# Patient Record
Sex: MALE | Race: WHITE | NOT HISPANIC OR LATINO | Employment: FULL TIME | URBAN - METROPOLITAN AREA
[De-identification: names, ages, dates, MRNs, and addresses within clinical notes are randomized per-mention and may not be internally consistent; named-entity substitution may affect disease eponyms.]

---

## 2023-01-30 PROBLEM — E55.9 VITAMIN D DEFICIENCY: Status: ACTIVE | Noted: 2023-01-30

## 2023-01-30 PROBLEM — M81.8 OTHER OSTEOPOROSIS WITHOUT CURRENT PATHOLOGICAL FRACTURE: Status: ACTIVE | Noted: 2023-01-30

## 2023-01-30 PROBLEM — M72.2 PLANTAR FASCIITIS: Status: ACTIVE | Noted: 2023-01-30

## 2023-01-30 PROBLEM — G89.29 CHRONIC MUSCULOSKELETAL PAIN: Status: ACTIVE | Noted: 2023-01-30

## 2023-01-30 PROBLEM — G89.29 CHRONIC LOW BACK PAIN: Status: ACTIVE | Noted: 2023-01-30

## 2023-01-30 PROBLEM — M25.552 BILATERAL HIP PAIN: Status: ACTIVE | Noted: 2023-01-30

## 2023-01-30 PROBLEM — R09.82 POSTNASAL DRIP: Status: ACTIVE | Noted: 2023-01-30

## 2023-01-30 PROBLEM — I10 HYPERTENSION: Status: ACTIVE | Noted: 2023-01-30

## 2023-01-30 PROBLEM — E79.0 HYPERURICEMIA: Status: ACTIVE | Noted: 2023-01-30

## 2023-01-30 PROBLEM — M79.18 CHRONIC MUSCULOSKELETAL PAIN: Status: ACTIVE | Noted: 2023-01-30

## 2023-01-30 PROBLEM — L98.9 SKIN LESION: Status: ACTIVE | Noted: 2023-01-30

## 2023-01-30 PROBLEM — R20.2 PARESTHESIA: Status: ACTIVE | Noted: 2023-01-30

## 2023-01-30 PROBLEM — R13.10 DYSPHAGIA, UNSPECIFIED: Status: ACTIVE | Noted: 2023-01-30

## 2023-01-30 PROBLEM — J30.9 ALLERGIC RHINITIS: Status: ACTIVE | Noted: 2023-01-30

## 2023-01-30 PROBLEM — G89.29 CHRONIC BACK PAIN: Status: ACTIVE | Noted: 2023-01-30

## 2023-01-30 PROBLEM — N40.1 BENIGN PROSTATIC HYPERPLASIA WITH LOWER URINARY TRACT SYMPTOMS: Status: ACTIVE | Noted: 2023-01-30

## 2023-01-30 PROBLEM — R10.2 PELVIC PAIN IN MALE: Status: ACTIVE | Noted: 2023-01-30

## 2023-01-30 PROBLEM — G89.4 CHRONIC PAIN SYNDROME: Status: ACTIVE | Noted: 2023-01-30

## 2023-01-30 PROBLEM — M25.551 BILATERAL HIP PAIN: Status: ACTIVE | Noted: 2023-01-30

## 2023-01-30 PROBLEM — M79.7 FIBROMYALGIA: Status: ACTIVE | Noted: 2023-01-30

## 2023-01-30 PROBLEM — R06.02 SOB (SHORTNESS OF BREATH) ON EXERTION: Status: ACTIVE | Noted: 2023-01-30

## 2023-01-30 PROBLEM — M25.50 ARTHRALGIA OF MULTIPLE SITES, BILATERAL: Status: ACTIVE | Noted: 2023-01-30

## 2023-01-30 PROBLEM — M79.669 PAIN, LOWER LEG: Status: ACTIVE | Noted: 2023-01-30

## 2023-01-30 PROBLEM — R10.30 GROIN PAIN: Status: ACTIVE | Noted: 2023-01-30

## 2023-01-30 PROBLEM — R10.9 RIGHT FLANK PAIN: Status: ACTIVE | Noted: 2023-01-30

## 2023-01-30 PROBLEM — R73.9 HYPERGLYCEMIA: Status: ACTIVE | Noted: 2023-01-30

## 2023-01-30 PROBLEM — N64.4 NIPPLE PAIN: Status: ACTIVE | Noted: 2023-01-30

## 2023-01-30 PROBLEM — N52.9 ERECTILE DYSFUNCTION: Status: ACTIVE | Noted: 2023-01-30

## 2023-01-30 PROBLEM — K30 INDIGESTION: Status: ACTIVE | Noted: 2023-01-30

## 2023-01-30 PROBLEM — N18.30 CKD (CHRONIC KIDNEY DISEASE), STAGE III (MULTI): Status: ACTIVE | Noted: 2023-01-30

## 2023-01-30 PROBLEM — R63.5 UNINTENDED WEIGHT GAIN: Status: ACTIVE | Noted: 2023-01-30

## 2023-01-30 PROBLEM — M54.50 CHRONIC LOW BACK PAIN: Status: ACTIVE | Noted: 2023-01-30

## 2023-01-30 PROBLEM — R10.31 RIGHT LOWER QUADRANT ABDOMINAL PAIN OF UNKNOWN ETIOLOGY: Status: ACTIVE | Noted: 2023-01-30

## 2023-01-30 PROBLEM — R51.9 HEADACHE: Status: ACTIVE | Noted: 2023-01-30

## 2023-01-30 PROBLEM — N50.82 PAIN IN SCROTUM WITHOUT TRAUMA OF SCROTUM: Status: ACTIVE | Noted: 2023-01-30

## 2023-01-30 PROBLEM — E66.3 OVERWEIGHT (BMI 25.0-29.9): Status: ACTIVE | Noted: 2023-01-30

## 2023-01-30 PROBLEM — K58.9 IRRITABLE BOWEL SYNDROME: Status: ACTIVE | Noted: 2023-01-30

## 2023-01-30 PROBLEM — R07.89 CHEST TIGHTNESS: Status: ACTIVE | Noted: 2023-01-30

## 2023-01-30 PROBLEM — M24.9 HYPERMOBILITY OF JOINT: Status: ACTIVE | Noted: 2023-01-30

## 2023-01-30 PROBLEM — M54.9 CHRONIC BACK PAIN: Status: ACTIVE | Noted: 2023-01-30

## 2023-01-30 PROBLEM — R00.1 SINUS BRADYCARDIA: Status: ACTIVE | Noted: 2023-01-30

## 2023-01-30 PROBLEM — H93.13 TINNITUS OF BOTH EARS: Status: ACTIVE | Noted: 2023-01-30

## 2023-01-30 PROBLEM — E78.5 HLD (HYPERLIPIDEMIA): Status: ACTIVE | Noted: 2023-01-30

## 2023-01-30 RX ORDER — DULOXETIN HYDROCHLORIDE 20 MG/1
20 CAPSULE, DELAYED RELEASE ORAL DAILY
COMMUNITY
Start: 2022-09-21 | End: 2023-11-14

## 2023-01-30 RX ORDER — ASPIRIN 325 MG
50000 TABLET, DELAYED RELEASE (ENTERIC COATED) ORAL
COMMUNITY
Start: 2022-07-12 | End: 2023-11-14 | Stop reason: DRUGHIGH

## 2023-01-30 RX ORDER — FAMOTIDINE 20 MG/1
1 TABLET, FILM COATED ORAL EVERY 12 HOURS
COMMUNITY
Start: 2022-07-12

## 2023-01-30 RX ORDER — TADALAFIL 5 MG/1
5 TABLET ORAL EVERY OTHER DAY
COMMUNITY
Start: 2022-07-12 | End: 2023-11-14 | Stop reason: SDUPTHER

## 2023-01-30 RX ORDER — AMLODIPINE BESYLATE 5 MG/1
1 TABLET ORAL DAILY
COMMUNITY
Start: 2022-06-09 | End: 2023-11-14 | Stop reason: SDUPTHER

## 2023-03-10 ENCOUNTER — APPOINTMENT (OUTPATIENT)
Dept: PRIMARY CARE | Facility: CLINIC | Age: 63
End: 2023-03-10
Payer: COMMERCIAL

## 2023-05-02 ENCOUNTER — OFFICE VISIT (OUTPATIENT)
Dept: PRIMARY CARE | Facility: CLINIC | Age: 63
End: 2023-05-02
Payer: COMMERCIAL

## 2023-05-02 VITALS
TEMPERATURE: 96.7 F | SYSTOLIC BLOOD PRESSURE: 120 MMHG | WEIGHT: 203 LBS | BODY MASS INDEX: 26.06 KG/M2 | DIASTOLIC BLOOD PRESSURE: 60 MMHG

## 2023-05-02 DIAGNOSIS — H01.005 BLEPHARITIS OF LEFT LOWER EYELID, UNSPECIFIED TYPE: Primary | ICD-10-CM

## 2023-05-02 PROCEDURE — 3078F DIAST BP <80 MM HG: CPT | Performed by: FAMILY MEDICINE

## 2023-05-02 PROCEDURE — 1036F TOBACCO NON-USER: CPT | Performed by: FAMILY MEDICINE

## 2023-05-02 PROCEDURE — 3074F SYST BP LT 130 MM HG: CPT | Performed by: FAMILY MEDICINE

## 2023-05-02 PROCEDURE — 99213 OFFICE O/P EST LOW 20 MIN: CPT | Performed by: FAMILY MEDICINE

## 2023-05-02 RX ORDER — CEPHALEXIN 250 MG/1
250 CAPSULE ORAL 4 TIMES DAILY
Qty: 28 CAPSULE | Refills: 0 | Status: SHIPPED | OUTPATIENT
Start: 2023-05-02 | End: 2023-05-09

## 2023-05-02 NOTE — PROGRESS NOTES
Chief complaint:   Chief Complaint   Patient presents with    Facial Swelling     Left eye       HPI:  Rohit Malik is a 62 y.o. male who presents for evaluation of left lower eyelid swelling and redness which started 8 days ago. He has been applying warm compresses to the area but stopped that 2 days ago. He reports it is 70% improved. It was described as pain and stinging sensation. The eye never got red. He never had fevers or chills. No discharge from the eye.     Physical exam:  /60 (BP Location: Left arm, Patient Position: Sitting)   Temp 35.9 °C (96.7 °F)   Wt 92.1 kg (203 lb)   BMI 26.06 kg/m²   General: NAD, well appearing male  Eye: white sclera, no discharge, EOM intact, PERRLA. Swelling and redness with scab noted under left eye    Assessment/Plan   Problem List Items Addressed This Visit    None  Visit Diagnoses       Blepharitis of left lower eyelid, unspecified type    -  Primary    Relevant Medications    cephalexin (Keflex) 250 mg capsule        Will treat and follow up 3 days if not improving, sooner if sx change or worsen    Anna Munoz, DO

## 2023-05-05 ENCOUNTER — OFFICE VISIT (OUTPATIENT)
Dept: PRIMARY CARE | Facility: CLINIC | Age: 63
End: 2023-05-05
Payer: COMMERCIAL

## 2023-05-05 VITALS
DIASTOLIC BLOOD PRESSURE: 70 MMHG | WEIGHT: 202 LBS | BODY MASS INDEX: 25.94 KG/M2 | TEMPERATURE: 96.6 F | SYSTOLIC BLOOD PRESSURE: 124 MMHG

## 2023-05-05 DIAGNOSIS — L03.211 CELLULITIS OF FACE: Primary | ICD-10-CM

## 2023-05-05 PROCEDURE — 99213 OFFICE O/P EST LOW 20 MIN: CPT | Performed by: FAMILY MEDICINE

## 2023-05-05 PROCEDURE — 3078F DIAST BP <80 MM HG: CPT | Performed by: FAMILY MEDICINE

## 2023-05-05 PROCEDURE — 1036F TOBACCO NON-USER: CPT | Performed by: FAMILY MEDICINE

## 2023-05-05 PROCEDURE — 3074F SYST BP LT 130 MM HG: CPT | Performed by: FAMILY MEDICINE

## 2023-05-05 NOTE — PROGRESS NOTES
Chief complaint:   Chief Complaint   Patient presents with    Follow-up     Left eye       HPI:  Rohit Malik is a 62 y.o. male who presents for evaluation of     Physical exam:  /70 (BP Location: Left arm, Patient Position: Sitting)   Temp 35.9 °C (96.6 °F)   Wt 91.6 kg (202 lb)   BMI 25.94 kg/m²   General: NAD, well appearing male  Heart: RRR, no mumur appreciated  Lungs: CTAB, no wheezes, rales, rhonchi  Abdomen: soft, non tender, normoactive BS, no organomegaly  Extremities: No LE edema    Assessment/Plan   Problem List Items Addressed This Visit    None  Visit Diagnoses       Cellulitis of face    -  Primary        Cellulitis improved since initiation of abx. Complete course and follow up if sx change or worsen or return.    Anna Munoz, DO

## 2023-10-23 ENCOUNTER — OFFICE VISIT (OUTPATIENT)
Dept: NEUROLOGY | Facility: CLINIC | Age: 63
End: 2023-10-23
Payer: COMMERCIAL

## 2023-10-23 DIAGNOSIS — R20.2 PARESTHESIA: ICD-10-CM

## 2023-10-23 DIAGNOSIS — M54.50 CHRONIC RIGHT-SIDED LOW BACK PAIN WITHOUT SCIATICA: Primary | ICD-10-CM

## 2023-10-23 DIAGNOSIS — M79.18 CHRONIC MUSCULOSKELETAL PAIN: ICD-10-CM

## 2023-10-23 DIAGNOSIS — G89.29 CHRONIC RIGHT-SIDED LOW BACK PAIN WITHOUT SCIATICA: Primary | ICD-10-CM

## 2023-10-23 DIAGNOSIS — G89.29 CHRONIC MUSCULOSKELETAL PAIN: ICD-10-CM

## 2023-10-23 PROCEDURE — 99214 OFFICE O/P EST MOD 30 MIN: CPT | Performed by: PSYCHIATRY & NEUROLOGY

## 2023-10-23 PROCEDURE — 1036F TOBACCO NON-USER: CPT | Performed by: PSYCHIATRY & NEUROLOGY

## 2023-10-23 RX ORDER — AMITRIPTYLINE HYDROCHLORIDE 10 MG/1
25 TABLET, FILM COATED ORAL NIGHTLY
Qty: 30 TABLET | Refills: 11 | Status: SHIPPED | OUTPATIENT
Start: 2023-10-23 | End: 2023-12-05

## 2023-10-23 ASSESSMENT — PATIENT HEALTH QUESTIONNAIRE - PHQ9
SUM OF ALL RESPONSES TO PHQ9 QUESTIONS 1 AND 2: 0
2. FEELING DOWN, DEPRESSED OR HOPELESS: NOT AT ALL
1. LITTLE INTEREST OR PLEASURE IN DOING THINGS: NOT AT ALL

## 2023-10-23 ASSESSMENT — ENCOUNTER SYMPTOMS
LOSS OF SENSATION IN FEET: 0
DEPRESSION: 0
OCCASIONAL FEELINGS OF UNSTEADINESS: 0

## 2023-10-23 NOTE — PROGRESS NOTES
I had a pleasure of seeing Mr. Rohit Malik in neurological f/u visit today for c/o diffuse paresthesias. Referred by PCP. Mr. Malik is a 61 y/o male with feet>hands paresthesias. Diffuse arthralgias led to diagnosis of fibromyalgia (?). H/o LBP. Certain activities can exacerbate symptoms which usually improve with rest. Symptoms can be waxing and waning in intensity and frequency but are present almost daily. Symptoms are usually mild to moderate, sometimes more severe. No major headaches or neck pain. No speech or language difficulties. No swallowing problems. No permanent focal weakness or numbness in extremities. No changes in bowels or bladder habits. No major gait or balance issues. No visual symptoms. EMG/NCS b/l LEs in 2022 c/w peripheral neuropathy/PN. Seen initially in August 2023 and started on Topamax for neuropathy. Detailed suggestions for potential blood work to evaluate for treatable and/or reversible causes of peripheral neuropathy were provided to patient's PCP (to avoid any duplications) during one of the previous clinical notes. In case of any abnormalities, if not treated or corrected, it can lead to progression of peripheral neuropathy in the future. PT. No new c/o today.    General appearance/Constitutional:  Well-developed/nourished, alert, cooperative, no acute distress. Fund of knowledge WNL. Appears stated age.    HEENT:  Head atraumatic, normocephalic, pupils equal, hearing grossly normal, mucosa moist, patent airways, neck supple, no tenderness, no JVD. Clear sclera.     Cardiovascular:  No palpitations or chest pain, rate and rhythm WNL.     Respiratory:  No respiratory distress, no SOB. Good chest extension.    Neurological:  Grossly stable neurological examination, AOx3, Speech clear, moves all extremities. Fund of knowledge WNL. Face symmetric with stable sensation. Hearing unchanged. Cranial nerves unchanged, motor and sensory examination stable, no ataxia, no tremor. Mood stable.  Gait stable.     Psychiatric:  Alert and cooperative. Mood and affect normal.    Abdomen:  Not distended, soft.     Extremities:  No major tenderness, stable sensation, peripheral pulses preserved, no major pedal edema, no clubbing, no cyanosis.     Musculoskeletal:  Good range of motion and strength throughout. No arthralgias.    Vascular/Lymphatic:  Pulses WNL, Non-edematous    Dermatological:  Skin warm, dry, no rash.    :  No incontinence or other urinary sx.     Comments:  Stable exam.        Assessment:    Peripheral neuropathy stable. EMG/NCS b/l LEs in 2022 c/w peripheral neuropathy/PN. Side effects with topiramate. Will try amitriptyline 25-50 mg at bedtime. Discontinue topiramate.    Start PT. We had a discussion about possible trial of AQUATIC THERAPY and/or water jogging exercises that the patient can perform on his own in the local community swimming pool or [facility] to prevent any lower back pain flare-ups and for conditioning paraspinal and abdominal muscles serving this purpose. This can also help patient's balance.    Patient will follow up with PCP for other medical problems. If desired, we would like to see the patient in the follow-up in the next 2-3 months or sooner if there is any worsening or new symptoms dictate. In the meanwhile, the patient was advised to call us with any question or concerns. The patient was also advised to go to ER or call 911 with any sudden worsening of symptoms.    This office note was created with speech recognition Dragon  software. While the final product was checked for mistakes (proofread), it is possible that typographical, grammatical and spelling mistakes remain. If there is any confusion, please do not hesitate to reach out to the office for clarification.    If you have labs or other testing completed and have not been informed of results within one week, the tests were most likely normal and/or without significant changes and can be  discussed during follow-up visit. Please call our office with any concerns. If you haven't done so, consider signing up for [facility], the [facility] personal health record were you can access your own results. Ask the staff how you can get started.

## 2023-11-07 ASSESSMENT — PROMIS GLOBAL HEALTH SCALE
RATE_AVERAGE_FATIGUE: MODERATE
CARRYOUT_SOCIAL_ACTIVITIES: GOOD
RATE_MENTAL_HEALTH: GOOD
RATE_GENERAL_HEALTH: FAIR
RATE_AVERAGE_PAIN: 6
RATE_QUALITY_OF_LIFE: GOOD
CARRYOUT_PHYSICAL_ACTIVITIES: MOSTLY
EMOTIONAL_PROBLEMS: NEVER
RATE_PHYSICAL_HEALTH: FAIR
RATE_SOCIAL_SATISFACTION: GOOD

## 2023-11-13 PROBLEM — R07.89 CHEST TIGHTNESS: Status: RESOLVED | Noted: 2023-11-13 | Resolved: 2023-11-13

## 2023-11-13 PROBLEM — K21.9 CHRONIC GERD: Status: ACTIVE | Noted: 2023-11-13

## 2023-11-13 PROBLEM — Z86.010 HISTORY OF ADENOMATOUS POLYP OF COLON: Status: RESOLVED | Noted: 2023-11-13 | Resolved: 2023-11-13

## 2023-11-13 PROBLEM — M75.50 BURSITIS OF SHOULDER: Status: RESOLVED | Noted: 2023-11-13 | Resolved: 2023-11-13

## 2023-11-13 PROBLEM — M85.80 OSTEOPENIA: Status: ACTIVE | Noted: 2023-11-13

## 2023-11-13 PROBLEM — R10.31 RLQ ABDOMINAL PAIN: Status: RESOLVED | Noted: 2023-11-13 | Resolved: 2023-11-13

## 2023-11-13 PROBLEM — G62.9 NEUROPATHY: Status: ACTIVE | Noted: 2023-11-13

## 2023-11-13 PROBLEM — L82.1 OTHER SEBORRHEIC KERATOSIS: Status: RESOLVED | Noted: 2021-02-03 | Resolved: 2023-11-13

## 2023-11-13 PROBLEM — G47.30 SLEEP APNEA: Status: ACTIVE | Noted: 2023-11-13

## 2023-11-13 PROBLEM — G62.9 PERIPHERAL POLYNEUROPATHY: Status: ACTIVE | Noted: 2023-11-13

## 2023-11-13 PROBLEM — Z86.0101 HISTORY OF ADENOMATOUS POLYP OF COLON: Status: RESOLVED | Noted: 2023-11-13 | Resolved: 2023-11-13

## 2023-11-13 PROBLEM — R00.1 BRADYCARDIA: Status: ACTIVE | Noted: 2023-11-13

## 2023-11-13 PROBLEM — R19.4 ALTERED BOWEL HABITS: Status: RESOLVED | Noted: 2023-11-13 | Resolved: 2023-11-13

## 2023-11-13 PROBLEM — K76.9 LIVER LESION: Status: ACTIVE | Noted: 2023-11-13

## 2023-11-14 ENCOUNTER — OFFICE VISIT (OUTPATIENT)
Dept: PRIMARY CARE | Facility: CLINIC | Age: 63
End: 2023-11-14
Payer: COMMERCIAL

## 2023-11-14 VITALS
WEIGHT: 214 LBS | DIASTOLIC BLOOD PRESSURE: 70 MMHG | SYSTOLIC BLOOD PRESSURE: 140 MMHG | BODY MASS INDEX: 27.46 KG/M2 | HEIGHT: 74 IN

## 2023-11-14 DIAGNOSIS — Z23 NEED FOR VACCINATION: ICD-10-CM

## 2023-11-14 DIAGNOSIS — E78.2 MIXED HYPERLIPIDEMIA: ICD-10-CM

## 2023-11-14 DIAGNOSIS — N18.30 STAGE 3 CHRONIC KIDNEY DISEASE, UNSPECIFIED WHETHER STAGE 3A OR 3B CKD (MULTI): ICD-10-CM

## 2023-11-14 DIAGNOSIS — E66.3 OVERWEIGHT (BMI 25.0-29.9): ICD-10-CM

## 2023-11-14 DIAGNOSIS — M79.7 FIBROMYALGIA: ICD-10-CM

## 2023-11-14 DIAGNOSIS — N52.9 ERECTILE DYSFUNCTION, UNSPECIFIED ERECTILE DYSFUNCTION TYPE: ICD-10-CM

## 2023-11-14 DIAGNOSIS — Z00.00 WELLNESS EXAMINATION: Primary | ICD-10-CM

## 2023-11-14 DIAGNOSIS — I10 PRIMARY HYPERTENSION: ICD-10-CM

## 2023-11-14 LAB
25(OH)D3 SERPL-MCNC: 37 NG/ML (ref 30–100)
ALBUMIN SERPL BCP-MCNC: 4.6 G/DL (ref 3.4–5)
ALP SERPL-CCNC: 67 U/L (ref 33–136)
ALT SERPL W P-5'-P-CCNC: 20 U/L (ref 10–52)
ANION GAP SERPL CALC-SCNC: 12 MMOL/L (ref 10–20)
AST SERPL W P-5'-P-CCNC: 18 U/L (ref 9–39)
BILIRUB SERPL-MCNC: 0.6 MG/DL (ref 0–1.2)
BUN SERPL-MCNC: 20 MG/DL (ref 6–23)
CALCIUM SERPL-MCNC: 9.6 MG/DL (ref 8.6–10.3)
CHLORIDE SERPL-SCNC: 103 MMOL/L (ref 98–107)
CHOLEST SERPL-MCNC: 266 MG/DL (ref 0–199)
CHOLESTEROL/HDL RATIO: 4.9
CO2 SERPL-SCNC: 29 MMOL/L (ref 21–32)
CREAT SERPL-MCNC: 1.28 MG/DL (ref 0.5–1.3)
ERYTHROCYTE [DISTWIDTH] IN BLOOD BY AUTOMATED COUNT: 12.8 % (ref 11.5–14.5)
GFR SERPL CREATININE-BSD FRML MDRD: 63 ML/MIN/1.73M*2
GLUCOSE SERPL-MCNC: 103 MG/DL (ref 74–99)
HCT VFR BLD AUTO: 45 % (ref 41–52)
HDLC SERPL-MCNC: 53.9 MG/DL
HGB BLD-MCNC: 14.5 G/DL (ref 13.5–17.5)
LDLC SERPL CALC-MCNC: 183 MG/DL
MCH RBC QN AUTO: 30.9 PG (ref 26–34)
MCHC RBC AUTO-ENTMCNC: 32.2 G/DL (ref 32–36)
MCV RBC AUTO: 96 FL (ref 80–100)
NON HDL CHOLESTEROL: 212 MG/DL (ref 0–149)
NRBC BLD-RTO: 0 /100 WBCS (ref 0–0)
PLATELET # BLD AUTO: 187 X10*3/UL (ref 150–450)
POTASSIUM SERPL-SCNC: 4.4 MMOL/L (ref 3.5–5.3)
PROT SERPL-MCNC: 7.1 G/DL (ref 6.4–8.2)
RBC # BLD AUTO: 4.7 X10*6/UL (ref 4.5–5.9)
SODIUM SERPL-SCNC: 140 MMOL/L (ref 136–145)
TRIGL SERPL-MCNC: 144 MG/DL (ref 0–149)
VLDL: 29 MG/DL (ref 0–40)
WBC # BLD AUTO: 4.6 X10*3/UL (ref 4.4–11.3)

## 2023-11-14 PROCEDURE — 90715 TDAP VACCINE 7 YRS/> IM: CPT | Performed by: FAMILY MEDICINE

## 2023-11-14 PROCEDURE — 80061 LIPID PANEL: CPT

## 2023-11-14 PROCEDURE — 3077F SYST BP >= 140 MM HG: CPT | Performed by: FAMILY MEDICINE

## 2023-11-14 PROCEDURE — 90471 IMMUNIZATION ADMIN: CPT | Performed by: FAMILY MEDICINE

## 2023-11-14 PROCEDURE — 36415 COLL VENOUS BLD VENIPUNCTURE: CPT

## 2023-11-14 PROCEDURE — 99396 PREV VISIT EST AGE 40-64: CPT | Performed by: FAMILY MEDICINE

## 2023-11-14 PROCEDURE — 82306 VITAMIN D 25 HYDROXY: CPT

## 2023-11-14 PROCEDURE — 80053 COMPREHEN METABOLIC PANEL: CPT

## 2023-11-14 PROCEDURE — 90750 HZV VACC RECOMBINANT IM: CPT | Performed by: FAMILY MEDICINE

## 2023-11-14 PROCEDURE — 3078F DIAST BP <80 MM HG: CPT | Performed by: FAMILY MEDICINE

## 2023-11-14 PROCEDURE — 90472 IMMUNIZATION ADMIN EACH ADD: CPT | Performed by: FAMILY MEDICINE

## 2023-11-14 PROCEDURE — 85027 COMPLETE CBC AUTOMATED: CPT

## 2023-11-14 PROCEDURE — 1036F TOBACCO NON-USER: CPT | Performed by: FAMILY MEDICINE

## 2023-11-14 RX ORDER — AMLODIPINE BESYLATE 5 MG/1
5 TABLET ORAL DAILY
Qty: 90 TABLET | Refills: 3 | Status: SHIPPED | OUTPATIENT
Start: 2023-11-14 | End: 2023-12-05

## 2023-11-14 RX ORDER — TADALAFIL 5 MG/1
5 TABLET ORAL EVERY OTHER DAY
Qty: 10 TABLET | Refills: 3 | Status: SHIPPED
Start: 2023-11-14 | End: 2023-12-05

## 2023-11-14 RX ORDER — VIT C/E/ZN/COPPR/LUTEIN/ZEAXAN 250MG-90MG
25 CAPSULE ORAL DAILY
COMMUNITY

## 2023-11-14 RX ORDER — DULOXETIN HYDROCHLORIDE 60 MG/1
60 CAPSULE, DELAYED RELEASE ORAL DAILY
Qty: 90 CAPSULE | Refills: 3 | Status: SHIPPED | OUTPATIENT
Start: 2023-11-14 | End: 2023-12-05

## 2023-11-14 ASSESSMENT — PATIENT HEALTH QUESTIONNAIRE - PHQ9
2. FEELING DOWN, DEPRESSED OR HOPELESS: NOT AT ALL
SUM OF ALL RESPONSES TO PHQ9 QUESTIONS 1 AND 2: 0
1. LITTLE INTEREST OR PLEASURE IN DOING THINGS: NOT AT ALL

## 2023-11-14 NOTE — ASSESSMENT & PLAN NOTE
- continue efforts at healthy diet and exercise habits  - discussed prostate cancer screening D recommendation from [facility] for asymptomatic males, he reports being asymptomatic and declines screening with PSA today  - last colon cancer screening: colonoscopy 5/2025, due 5/2025  - fasting labs ordered  - Shingrix series started today (will need to return in 2-6 mo for second)  - Tdap given in office  - follow up annually

## 2023-11-14 NOTE — PROGRESS NOTES
"Chief complaint:   Chief Complaint   Patient presents with    Annual Exam     CPE       HPI:  Rohit Malik is a 63 y.o. male who presents for a physical    He has not started Amlodipine for his BP    Exercise: nothing other then what he does at work. He does stretch daily     ROS:  Constitutional:  Denies fevers, chills, night sweats  HEENT: Admits to mildly worsening vision Denies change in hearing, sore throat, rhinorrhea, congestion  Cardiovascular: Denies chest pain, SOB with exertion, racing heart, slow heart rate, palpitations, leg edema  Pulmonary: Denies cough, wheezing, SOB  Gastrointestinal: Admits to heartburn occasionally Denies abdominal pain, diarrhea, constipation, nausea, vomiting  Genitourinary: Admits to erectile dysfunction Denies dysuria, hematuria, incontinence  Integumentary: Denies rash, new or changed skin lesions  Neuro: Admits to headaches Denies numbness, tingling  Musculoskeletal: Admits to arthralgias and back pain, myalgias   Psych: denies change in mood, sleeping difficulties  Heme: denies bruising or bleeding     Physical exam:  /70   Ht 1.88 m (6' 2\")   Wt 97.1 kg (214 lb)   BMI 27.48 kg/m²   General: NAD, well appearing male  Head: normocephalic  Ears: EAC patent, TM normal bilaterally  Eyes: EOM intact, PERRLA  Nose: moist  Mouth: moist, good dentition  Heart: RRR, no murmur appreciated  Lungs: CTAB, no wheezes, rales, rhonchi  Abdomen: soft, non tender, no organomegaly  Psych: mood and affect congruent, alert and oriented  MSK: +5/5 gross strength  Neuro: +2/4 patellar and biceps reflexes, sensation grossly intact    Assessment/Plan   Problem List Items Addressed This Visit       Fibromyalgia    Relevant Medications    DULoxetine (Cymbalta) 60 mg DR capsule    Overweight (BMI 25.0-29.9)     - stable  - encouraged increase in exercise 30 min 5 days weekly         HLD (hyperlipidemia)    Hypertension     - not at goal  - recommend him to start his Amlodipine 5 mg PO " daily           Relevant Medications    amLODIPine (Norvasc) 5 mg tablet    Erectile dysfunction     - refilled Cialis for PRN use         Relevant Medications    tadalafil (Cialis) 5 mg tablet    CKD (chronic kidney disease), stage III (CMS/HCC)     - patient reports he will schedule with his nephrologist         Wellness examination - Primary     - continue efforts at healthy diet and exercise habits  - discussed prostate cancer screening D recommendation from [facility] for asymptomatic males, he reports being asymptomatic and declines screening with PSA today  - last colon cancer screening: colonoscopy 5/2025, due 5/2025  - fasting labs ordered  - Shingrix series started today (will need to return in 2-6 mo for second)  - Tdap given in office  - follow up annually         Relevant Orders    CBC    Comprehensive Metabolic Panel    Lipid Panel    Vitamin D 25-Hydroxy,Total (for eval of Vitamin D levels)     Other Visit Diagnoses       Need for vaccination        Relevant Orders    Tdap vaccine, age 7 years and older  (BOOSTRIX) (Completed)    Zoster vaccine, recombinant, adult (SHINGRIX) (Completed)            Anna Munoz, DO

## 2023-12-05 ENCOUNTER — OFFICE VISIT (OUTPATIENT)
Dept: PRIMARY CARE | Facility: CLINIC | Age: 63
End: 2023-12-05
Payer: COMMERCIAL

## 2023-12-05 VITALS — SYSTOLIC BLOOD PRESSURE: 130 MMHG | BODY MASS INDEX: 27.73 KG/M2 | DIASTOLIC BLOOD PRESSURE: 78 MMHG | WEIGHT: 216 LBS

## 2023-12-05 DIAGNOSIS — N52.9 ERECTILE DYSFUNCTION, UNSPECIFIED ERECTILE DYSFUNCTION TYPE: ICD-10-CM

## 2023-12-05 DIAGNOSIS — M79.7 FIBROMYALGIA: ICD-10-CM

## 2023-12-05 PROCEDURE — 99214 OFFICE O/P EST MOD 30 MIN: CPT | Performed by: FAMILY MEDICINE

## 2023-12-05 PROCEDURE — 1036F TOBACCO NON-USER: CPT | Performed by: FAMILY MEDICINE

## 2023-12-05 PROCEDURE — 3078F DIAST BP <80 MM HG: CPT | Performed by: FAMILY MEDICINE

## 2023-12-05 PROCEDURE — 3075F SYST BP GE 130 - 139MM HG: CPT | Performed by: FAMILY MEDICINE

## 2023-12-05 RX ORDER — TADALAFIL 5 MG/1
5 TABLET ORAL EVERY OTHER DAY
Qty: 45 TABLET | Refills: 3 | Status: SHIPPED | OUTPATIENT
Start: 2023-12-05

## 2023-12-05 RX ORDER — DULOXETIN HYDROCHLORIDE 30 MG/1
30 CAPSULE, DELAYED RELEASE ORAL DAILY
Qty: 30 CAPSULE | Refills: 0 | Status: SHIPPED | OUTPATIENT
Start: 2023-12-05 | End: 2024-02-20

## 2023-12-05 NOTE — PROGRESS NOTES
Chief complaint:   Chief Complaint   Patient presents with    Medication Question     Discuss stopping Duloxetine- causing nausea and headaches       HPI:  Rohit Malik is a 63 y.o. male who presents for evaluation of duloxetine. He feels that this has contributed to nausea and headaches. He would like to discontinue this. He has not started his Amlodipine for his blood pressure. He has self discontinued other medications as well like Amitriptyline. He would like a refill of Cialis which he was prescribed to take daily by his urologist but takes every other day instead.     Physical exam:  /78   Wt 98 kg (216 lb)   BMI 27.73 kg/m²   General: NAD, well appearing male  Heart: RRR, no mumur appreciated  Lungs: CTAB, no wheezes, rales, rhonchi  Abdomen: soft, non tender, normoactive BS, no organomegaly  Extremities: No LE edema    Assessment/Plan   Problem List Items Addressed This Visit       Fibromyalgia    Relevant Medications    DULoxetine (Cymbalta) 30 mg DR capsule    Erectile dysfunction    Relevant Medications    tadalafil (Cialis) 5 mg tablet   Duloxetine decreased from 60 mg to 30 mg and after 1 mo can discontinue. Follow up if sx persist after discontinuation, sooner as needed.     Refilled Cialis 5 mg tablets every other day as he has been taking. Continue care with urology    Anna Munoz, DO

## 2024-01-23 ENCOUNTER — APPOINTMENT (OUTPATIENT)
Dept: NEUROLOGY | Facility: CLINIC | Age: 64
End: 2024-01-23
Payer: COMMERCIAL

## 2024-02-19 PROBLEM — N50.82 PAIN IN SCROTUM WITHOUT TRAUMA OF SCROTUM: Status: RESOLVED | Noted: 2023-01-30 | Resolved: 2024-02-19

## 2024-02-20 ENCOUNTER — OFFICE VISIT (OUTPATIENT)
Dept: PRIMARY CARE | Facility: CLINIC | Age: 64
End: 2024-02-20
Payer: COMMERCIAL

## 2024-02-20 VITALS
DIASTOLIC BLOOD PRESSURE: 80 MMHG | TEMPERATURE: 96.4 F | SYSTOLIC BLOOD PRESSURE: 126 MMHG | BODY MASS INDEX: 27.17 KG/M2 | WEIGHT: 211.64 LBS

## 2024-02-20 DIAGNOSIS — Z23 NEED FOR VACCINATION: ICD-10-CM

## 2024-02-20 DIAGNOSIS — M79.7 FIBROMYALGIA: ICD-10-CM

## 2024-02-20 DIAGNOSIS — I10 PRIMARY HYPERTENSION: Primary | ICD-10-CM

## 2024-02-20 DIAGNOSIS — E78.2 MIXED HYPERLIPIDEMIA: ICD-10-CM

## 2024-02-20 DIAGNOSIS — E66.3 OVERWEIGHT (BMI 25.0-29.9): ICD-10-CM

## 2024-02-20 DIAGNOSIS — K21.9 CHRONIC GERD: ICD-10-CM

## 2024-02-20 DIAGNOSIS — E55.9 VITAMIN D DEFICIENCY: ICD-10-CM

## 2024-02-20 PROBLEM — R07.89 CHEST TIGHTNESS: Status: RESOLVED | Noted: 2023-01-30 | Resolved: 2024-02-20

## 2024-02-20 PROBLEM — R06.02 SOB (SHORTNESS OF BREATH) ON EXERTION: Status: RESOLVED | Noted: 2023-01-30 | Resolved: 2024-02-20

## 2024-02-20 PROCEDURE — 3074F SYST BP LT 130 MM HG: CPT | Performed by: FAMILY MEDICINE

## 2024-02-20 PROCEDURE — 1036F TOBACCO NON-USER: CPT | Performed by: FAMILY MEDICINE

## 2024-02-20 PROCEDURE — 3079F DIAST BP 80-89 MM HG: CPT | Performed by: FAMILY MEDICINE

## 2024-02-20 PROCEDURE — 99213 OFFICE O/P EST LOW 20 MIN: CPT | Performed by: FAMILY MEDICINE

## 2024-02-20 PROCEDURE — 90750 HZV VACC RECOMBINANT IM: CPT | Performed by: FAMILY MEDICINE

## 2024-02-20 PROCEDURE — 90471 IMMUNIZATION ADMIN: CPT | Performed by: FAMILY MEDICINE

## 2024-02-20 ASSESSMENT — PATIENT HEALTH QUESTIONNAIRE - PHQ9
SUM OF ALL RESPONSES TO PHQ9 QUESTIONS 1 AND 2: 0
1. LITTLE INTEREST OR PLEASURE IN DOING THINGS: NOT AT ALL
2. FEELING DOWN, DEPRESSED OR HOPELESS: NOT AT ALL

## 2024-02-20 NOTE — ASSESSMENT & PLAN NOTE
- not currently taking his Vitamin D regularly but states he will restart  - last lab vitamin D level wnl 11/2023

## 2024-02-20 NOTE — PROGRESS NOTES
Chief complaint:   Chief Complaint   Patient presents with    Follow-up     2 month follow up       HPI:  Rohit Malik is a 63 y.o. male who presents for evaluation and follow up as he was not feeling well last visit and had some altered/heightened taste and smell. A taper off the Cymbalta was recommended and he tapered quicker then recommended within 2 weeks and is feeling much better and back to himself. He has normal taste and smell again. He is BP is in normal range today despite no BP medication.    He takes Cialis every other day  He takes Famotidine 20 mg  BID   He does not regularly take his vitamin D    Physical exam:  /80   Temp 35.8 °C (96.4 °F)   Wt 96 kg (211 lb 10.3 oz)   BMI 27.17 kg/m²   General: NAD, well appearing male  Heart: RRR, no mumur appreciated  Lungs: CTAB, no wheezes, rales, rhonchi    Assessment/Plan   Problem List Items Addressed This Visit       Fibromyalgia     - did not tolerate Duloxetine and has been off since 12/2023  - feeling well and stable         Overweight (BMI 25.0-29.9)     - has lost a few pounds, encourage continued healthy habits           HLD (hyperlipidemia)     - elevated ASCVD risk score of 11.8 %, statin has been recommended but he has declined          Hypertension - Primary     - at goal, not currently on his Amlodipine 5 mg PO daily           Vitamin D deficiency     - not currently taking his Vitamin D regularly but states he will restart  - last lab vitamin D level wnl 11/2023         Chronic GERD     - well controlled with Pepcid 20 mg PO BID          Other Visit Diagnoses       Need for vaccination        Relevant Orders    Zoster vaccine, recombinant, adult (SHINGRIX) (Completed)        Second Shingrix vaccination given in office today    Anna Munoz DO

## 2024-02-22 ENCOUNTER — APPOINTMENT (OUTPATIENT)
Dept: NEPHROLOGY | Facility: CLINIC | Age: 64
End: 2024-02-22
Payer: COMMERCIAL

## 2024-02-29 ENCOUNTER — OFFICE VISIT (OUTPATIENT)
Dept: NEPHROLOGY | Facility: CLINIC | Age: 64
End: 2024-02-29
Payer: COMMERCIAL

## 2024-02-29 ENCOUNTER — LAB (OUTPATIENT)
Dept: LAB | Facility: LAB | Age: 64
End: 2024-02-29
Payer: COMMERCIAL

## 2024-02-29 VITALS
BODY MASS INDEX: 27.21 KG/M2 | WEIGHT: 212 LBS | SYSTOLIC BLOOD PRESSURE: 153 MMHG | HEIGHT: 74 IN | DIASTOLIC BLOOD PRESSURE: 79 MMHG | HEART RATE: 56 BPM

## 2024-02-29 DIAGNOSIS — I10 ESSENTIAL HYPERTENSION: ICD-10-CM

## 2024-02-29 DIAGNOSIS — N18.2 CKD (CHRONIC KIDNEY DISEASE) STAGE 2, GFR 60-89 ML/MIN: Primary | ICD-10-CM

## 2024-02-29 DIAGNOSIS — N18.2 CKD (CHRONIC KIDNEY DISEASE) STAGE 2, GFR 60-89 ML/MIN: ICD-10-CM

## 2024-02-29 LAB
25(OH)D3 SERPL-MCNC: 33 NG/ML (ref 30–100)
ANION GAP SERPL CALC-SCNC: 11 MMOL/L (ref 10–20)
BUN SERPL-MCNC: 22 MG/DL (ref 6–23)
CALCIUM SERPL-MCNC: 9.7 MG/DL (ref 8.6–10.3)
CHLORIDE SERPL-SCNC: 104 MMOL/L (ref 98–107)
CO2 SERPL-SCNC: 30 MMOL/L (ref 21–32)
CREAT SERPL-MCNC: 1.4 MG/DL (ref 0.5–1.3)
EGFRCR SERPLBLD CKD-EPI 2021: 57 ML/MIN/1.73M*2
GLUCOSE SERPL-MCNC: 100 MG/DL (ref 74–99)
POTASSIUM SERPL-SCNC: 4.3 MMOL/L (ref 3.5–5.3)
PTH-INTACT SERPL-MCNC: 40.7 PG/ML (ref 18.5–88)
SODIUM SERPL-SCNC: 141 MMOL/L (ref 136–145)

## 2024-02-29 PROCEDURE — 82306 VITAMIN D 25 HYDROXY: CPT

## 2024-02-29 PROCEDURE — 99214 OFFICE O/P EST MOD 30 MIN: CPT | Performed by: INTERNAL MEDICINE

## 2024-02-29 PROCEDURE — 36415 COLL VENOUS BLD VENIPUNCTURE: CPT

## 2024-02-29 PROCEDURE — 80048 BASIC METABOLIC PNL TOTAL CA: CPT

## 2024-02-29 PROCEDURE — 3077F SYST BP >= 140 MM HG: CPT | Performed by: INTERNAL MEDICINE

## 2024-02-29 PROCEDURE — 3078F DIAST BP <80 MM HG: CPT | Performed by: INTERNAL MEDICINE

## 2024-02-29 PROCEDURE — 1036F TOBACCO NON-USER: CPT | Performed by: INTERNAL MEDICINE

## 2024-02-29 PROCEDURE — 83970 ASSAY OF PARATHORMONE: CPT

## 2024-02-29 RX ORDER — AMLODIPINE BESYLATE 2.5 MG/1
2.5 TABLET ORAL DAILY
Qty: 90 TABLET | Refills: 3 | Status: SHIPPED | OUTPATIENT
Start: 2024-02-29 | End: 2025-02-28

## 2024-02-29 NOTE — PROGRESS NOTES
Rohit ROJAS King   63 y.o.    @@  [facility]/Room: [id number]/Room/bed info not found    Subjective:   The patient is being seen for a routine clinic follow-up of chronic kidney disease. Recently, the disease has been stable. Disease complications:  No hyperkalemia, no hypocalcemia, no hyperphosphatemia, no metabolic acidosis, no coagulopathy, no uremic encephalopathy, no neuropathy and no renal osteodystrophy. The patient is currently asymptomatic. No associated symptoms are reported.       Meds:   Current Outpatient Medications   Medication Sig Dispense Refill    cholecalciferol (Vitamin D3) 25 MCG (1000 UT) capsule Take 1 capsule (25 mcg) by mouth once daily.      famotidine (Pepcid) 20 mg tablet Take 1 tablet (20 mg) by mouth every 12 hours.      tadalafil (Cialis) 5 mg tablet Take 1 tablet (5 mg) by mouth every other day. 45 tablet 3     No current facility-administered medications for this visit.          ROS:  The patient is awake and oriented. No dizziness or lightheadedness. No chills and no fever. No headaches. No nausea and no vomiting. No shortness of breath. No cough. No sputum. No chest pain. No chest tightness. No abdominal pain. No diarrhea and no constipation. No hematemesis or hemoptysis. No hematuria. No rectal bleeding. No melena. No epistaxis. No urinary symptoms. No flank pain. No leg edema. No leg pain. No weakness. No itching. Overall, the rest of the review of systems is also negative.  12 point review of systems otherwise negative as stated in HPI.        Physical Examination:        Vitals:    02/29/24 0912   BP: 153/79   Pulse: 56     General: The patient is awake, oriented, and is not in any distress.  Head and Neck: Normocephalic. No periorbital edema.  Eyes: non-icteric  Respiratory: Symmetric air entry. Symmetric chest expansion.No respiratory distress.  Cardiovascular: Symmetric peripheral pulses.  Skin: No maculopapular rash.  Abdomen: soft, nt/nd  Musculoskeletal: No peripheral edema in  both left and right upper extremities.  No edema in either left or right lower extremities.  Neuro Exam: Speech is fluent. Moves extremities.    Imaging:  === 07/08/22 ===    US RENAL COMPLETE    - Impression -  No correlate for flank pain. Specifically no evidence of  hydronephrosis or renal stone.       Blood Labs:  No results found for this or any previous visit (from the past 24 hour(s)).   Lab Results   Component Value Date    PTH 56.2 06/09/2022    PROTUR NEGATIVE 06/13/2022    PROTUR <4 (L) 06/04/2020    PHOS 3.1 06/09/2022      Lab Results   Component Value Date    GLUCOSE 103 (H) 11/14/2023    CALCIUM 9.6 11/14/2023     11/14/2023    K 4.4 11/14/2023    CO2 29 11/14/2023     11/14/2023    BUN 20 11/14/2023    CREATININE 1.28 11/14/2023         Assessment and Plan:    1 chronic kidney disease stage II/III. Last Cr level from November 2023 is 1.28. I asked for a basic metabolic panel to be done today. I asked for PTH, phosphorus, and 25-hydroxy vitamin D level.     2. Hypertension. His blood pressure is high. He is not on any antihypertensive medications.  I added a small dose of amlodipine to his medications.I will see him in about 6 months for follow-up.     3.  Dyslipidemia.  He is LDL cholesterol is significantly high.  He is not on any statin.  I advised him to discuss with his primary care physician.        Spencer Henson MD

## 2024-08-23 ENCOUNTER — APPOINTMENT (OUTPATIENT)
Dept: NEPHROLOGY | Facility: CLINIC | Age: 64
End: 2024-08-23
Payer: COMMERCIAL

## 2024-09-21 DIAGNOSIS — N52.9 ERECTILE DYSFUNCTION, UNSPECIFIED ERECTILE DYSFUNCTION TYPE: ICD-10-CM

## 2024-09-22 RX ORDER — TADALAFIL 5 MG/1
TABLET ORAL
Qty: 45 TABLET | Refills: 3 | OUTPATIENT
Start: 2024-09-22

## 2024-11-15 ENCOUNTER — APPOINTMENT (OUTPATIENT)
Dept: PRIMARY CARE | Facility: CLINIC | Age: 64
End: 2024-11-15
Payer: COMMERCIAL

## 2024-11-15 VITALS
DIASTOLIC BLOOD PRESSURE: 76 MMHG | TEMPERATURE: 96.6 F | BODY MASS INDEX: 25.15 KG/M2 | HEIGHT: 74 IN | WEIGHT: 196 LBS | SYSTOLIC BLOOD PRESSURE: 132 MMHG

## 2024-11-15 DIAGNOSIS — N52.9 ERECTILE DYSFUNCTION, UNSPECIFIED ERECTILE DYSFUNCTION TYPE: ICD-10-CM

## 2024-11-15 DIAGNOSIS — Z00.00 WELLNESS EXAMINATION: Primary | ICD-10-CM

## 2024-11-15 LAB
25(OH)D3 SERPL-MCNC: 36 NG/ML (ref 30–100)
ALBUMIN SERPL BCP-MCNC: 4.6 G/DL (ref 3.4–5)
ALP SERPL-CCNC: 63 U/L (ref 33–136)
ALT SERPL W P-5'-P-CCNC: 14 U/L (ref 10–52)
ANION GAP SERPL CALC-SCNC: 11 MMOL/L (ref 10–20)
AST SERPL W P-5'-P-CCNC: 18 U/L (ref 9–39)
BILIRUB SERPL-MCNC: 0.8 MG/DL (ref 0–1.2)
BUN SERPL-MCNC: 20 MG/DL (ref 6–23)
CALCIUM SERPL-MCNC: 10 MG/DL (ref 8.6–10.3)
CHLORIDE SERPL-SCNC: 103 MMOL/L (ref 98–107)
CHOLEST SERPL-MCNC: 313 MG/DL (ref 0–199)
CHOLESTEROL/HDL RATIO: 5.6
CO2 SERPL-SCNC: 28 MMOL/L (ref 21–32)
CREAT SERPL-MCNC: 1.43 MG/DL (ref 0.5–1.3)
EGFRCR SERPLBLD CKD-EPI 2021: 55 ML/MIN/1.73M*2
ERYTHROCYTE [DISTWIDTH] IN BLOOD BY AUTOMATED COUNT: 12.6 % (ref 11.5–14.5)
GLUCOSE SERPL-MCNC: 99 MG/DL (ref 74–99)
HCT VFR BLD AUTO: 46 % (ref 41–52)
HDLC SERPL-MCNC: 55.4 MG/DL
HGB BLD-MCNC: 14.9 G/DL (ref 13.5–17.5)
LDLC SERPL CALC-MCNC: 240 MG/DL
MCH RBC QN AUTO: 29.9 PG (ref 26–34)
MCHC RBC AUTO-ENTMCNC: 32.4 G/DL (ref 32–36)
MCV RBC AUTO: 92 FL (ref 80–100)
NON HDL CHOLESTEROL: 258 MG/DL (ref 0–149)
NRBC BLD-RTO: 0 /100 WBCS (ref 0–0)
PLATELET # BLD AUTO: 173 X10*3/UL (ref 150–450)
POTASSIUM SERPL-SCNC: 4.4 MMOL/L (ref 3.5–5.3)
PROT SERPL-MCNC: 6.9 G/DL (ref 6.4–8.2)
RBC # BLD AUTO: 4.98 X10*6/UL (ref 4.5–5.9)
SODIUM SERPL-SCNC: 138 MMOL/L (ref 136–145)
TRIGL SERPL-MCNC: 86 MG/DL (ref 0–149)
VLDL: 17 MG/DL (ref 0–40)
WBC # BLD AUTO: 4.2 X10*3/UL (ref 4.4–11.3)

## 2024-11-15 PROCEDURE — 3075F SYST BP GE 130 - 139MM HG: CPT | Performed by: FAMILY MEDICINE

## 2024-11-15 PROCEDURE — 3008F BODY MASS INDEX DOCD: CPT | Performed by: FAMILY MEDICINE

## 2024-11-15 PROCEDURE — 82306 VITAMIN D 25 HYDROXY: CPT

## 2024-11-15 PROCEDURE — 80053 COMPREHEN METABOLIC PANEL: CPT

## 2024-11-15 PROCEDURE — 3078F DIAST BP <80 MM HG: CPT | Performed by: FAMILY MEDICINE

## 2024-11-15 PROCEDURE — 99396 PREV VISIT EST AGE 40-64: CPT | Performed by: FAMILY MEDICINE

## 2024-11-15 PROCEDURE — 1036F TOBACCO NON-USER: CPT | Performed by: FAMILY MEDICINE

## 2024-11-15 PROCEDURE — 85027 COMPLETE CBC AUTOMATED: CPT

## 2024-11-15 PROCEDURE — 80061 LIPID PANEL: CPT

## 2024-11-15 RX ORDER — TADALAFIL 5 MG/1
5 TABLET ORAL EVERY OTHER DAY
Qty: 45 TABLET | Refills: 3 | Status: SHIPPED | OUTPATIENT
Start: 2024-11-15

## 2024-11-15 ASSESSMENT — PATIENT HEALTH QUESTIONNAIRE - PHQ9
1. LITTLE INTEREST OR PLEASURE IN DOING THINGS: NOT AT ALL
SUM OF ALL RESPONSES TO PHQ9 QUESTIONS 1 AND 2: 0
2. FEELING DOWN, DEPRESSED OR HOPELESS: NOT AT ALL

## 2024-11-15 ASSESSMENT — PROMIS GLOBAL HEALTH SCALE
RATE_SOCIAL_SATISFACTION: VERY GOOD
RATE_GENERAL_HEALTH: GOOD
RATE_PHYSICAL_HEALTH: FAIR
RATE_AVERAGE_PAIN: 3
CARRYOUT_PHYSICAL_ACTIVITIES: MOSTLY
RATE_MENTAL_HEALTH: GOOD
RATE_AVERAGE_FATIGUE: MODERATE
RATE_QUALITY_OF_LIFE: GOOD
CARRYOUT_SOCIAL_ACTIVITIES: GOOD
EMOTIONAL_PROBLEMS: NEVER

## 2024-11-15 NOTE — ASSESSMENT & PLAN NOTE
- continue efforts at healthy diet and exercise habits  - discussed prostate cancer screening D recommendation from [facility] for asymptomatic males, he reports being asymptomatic and declines screening with PSA today  - last colon cancer screening: colonoscopy 6/18/2024, due 10 years (6/2034)  - Shingrix series complete  - Tdap 11/2023  - follow up annually

## 2024-11-15 NOTE — PROGRESS NOTES
"Chief complaint:   Chief Complaint   Patient presents with    Annual Exam     CPE       HPI:  Rohit Malik is a 64 y.o. male who presents for a physical    Exercise: minimal  Alcohol: none  Tobacco: none  Drug: none  Sexually active: yes, has ED, uses Cialis    ROS:  Constitutional:  Denies fevers, chills, night sweats  HEENT: Denies change in vision, change in hearing, sore throat, rhinorrhea, congestion  Cardiovascular: Denies chest pain, SOB with exertion, racing heart, slow heart rate, palpitations, leg edema  Pulmonary: Denies cough, wheezing, SOB  Gastrointestinal: Admits to alternating constipation and diarrhea Denies abdominal pain, nausea, vomiting, heartburn, blood in stool  Genitourinary: Admits to sexual dysfunction  Denies dysuria, hematuria, incontinence  Integumentary: Denies rash, new or changed skin lesions  Neuro: Denies headache, numbness, tingling  Musculoskeletal: Admits to myalgias, arthralgias, back pain  Psych: denies change in mood, sleeping difficulties  Heme: denies bruising or bleeding     Physical exam:  /76 (BP Location: Left arm, Patient Position: Sitting)   Temp 35.9 °C (96.6 °F)   Ht 1.88 m (6' 2\")   Wt 88.9 kg (196 lb)   BMI 25.16 kg/m²   General: NAD, well appearing male  Head: normocephalic  Ears: EAC patent, TM normal bilaterally  Eyes: EOM intact, PERRLA  Nose: moist  Mouth: moist, good dentition  Heart: RRR, no murmur appreciated  Lungs: CTAB, no wheezes, rales, rhonchi  Abdomen: soft, non tender, no organomegaly  Psych: mood and affect congruent, alert and oriented  MSK: +5/5 gross strength  Neuro: +2/4 patellar and biceps reflexes, sensation grossly intact  Skin: warm and dry    Assessment/Plan   Problem List Items Addressed This Visit       Erectile dysfunction    Relevant Medications    tadalafil (Cialis) 5 mg tablet    Wellness examination - Primary     - continue efforts at healthy diet and exercise habits  - discussed prostate cancer screening D " recommendation from [facility] for asymptomatic males, he reports being asymptomatic and declines screening with PSA today  - last colon cancer screening: colonoscopy 6/18/2024, due 10 years (6/2034)  - Shingrix series complete  - Tdap 11/2023  - follow up annually         Relevant Orders    Comprehensive Metabolic Panel    Lipid Panel    CBC    Vitamin D 25-Hydroxy,Total (for eval of Vitamin D levels)     Declines Influenza vaccination    Anna Munoz, DO

## 2024-11-20 DIAGNOSIS — R79.89 ABNORMAL CBC: Primary | ICD-10-CM

## 2024-12-21 ENCOUNTER — LAB (OUTPATIENT)
Dept: LAB | Facility: LAB | Age: 64
End: 2024-12-21
Payer: COMMERCIAL

## 2024-12-21 DIAGNOSIS — R79.89 ABNORMAL CBC: ICD-10-CM

## 2024-12-21 LAB
BASOPHILS # BLD AUTO: 0.02 X10*3/UL (ref 0–0.1)
BASOPHILS NFR BLD AUTO: 0.4 %
EOSINOPHIL # BLD AUTO: 0.17 X10*3/UL (ref 0–0.7)
EOSINOPHIL NFR BLD AUTO: 3.4 %
ERYTHROCYTE [DISTWIDTH] IN BLOOD BY AUTOMATED COUNT: 12.8 % (ref 11.5–14.5)
HCT VFR BLD AUTO: 45.1 % (ref 41–52)
HGB BLD-MCNC: 14.6 G/DL (ref 13.5–17.5)
IMM GRANULOCYTES # BLD AUTO: 0.02 X10*3/UL (ref 0–0.7)
IMM GRANULOCYTES NFR BLD AUTO: 0.4 % (ref 0–0.9)
LYMPHOCYTES # BLD AUTO: 1.8 X10*3/UL (ref 1.2–4.8)
LYMPHOCYTES NFR BLD AUTO: 36.4 %
MCH RBC QN AUTO: 30.4 PG (ref 26–34)
MCHC RBC AUTO-ENTMCNC: 32.4 G/DL (ref 32–36)
MCV RBC AUTO: 94 FL (ref 80–100)
MONOCYTES # BLD AUTO: 0.37 X10*3/UL (ref 0.1–1)
MONOCYTES NFR BLD AUTO: 7.5 %
NEUTROPHILS # BLD AUTO: 2.56 X10*3/UL (ref 1.2–7.7)
NEUTROPHILS NFR BLD AUTO: 51.9 %
NRBC BLD-RTO: 0 /100 WBCS (ref 0–0)
PLATELET # BLD AUTO: 192 X10*3/UL (ref 150–450)
RBC # BLD AUTO: 4.81 X10*6/UL (ref 4.5–5.9)
WBC # BLD AUTO: 4.9 X10*3/UL (ref 4.4–11.3)

## 2024-12-21 PROCEDURE — 36415 COLL VENOUS BLD VENIPUNCTURE: CPT

## 2024-12-21 PROCEDURE — 85025 COMPLETE CBC W/AUTO DIFF WBC: CPT

## 2025-11-18 ENCOUNTER — APPOINTMENT (OUTPATIENT)
Dept: PRIMARY CARE | Facility: CLINIC | Age: 65
End: 2025-11-18
Payer: COMMERCIAL